# Patient Record
Sex: FEMALE | URBAN - METROPOLITAN AREA
[De-identification: names, ages, dates, MRNs, and addresses within clinical notes are randomized per-mention and may not be internally consistent; named-entity substitution may affect disease eponyms.]

---

## 2021-12-28 ENCOUNTER — APPOINTMENT (RX ONLY)
Dept: URBAN - METROPOLITAN AREA CLINIC 87 | Facility: CLINIC | Age: 31
Setting detail: DERMATOLOGY
End: 2021-12-28

## 2021-12-28 VITALS — HEIGHT: 64 IN | WEIGHT: 155 LBS

## 2021-12-28 DIAGNOSIS — R21 RASH AND OTHER NONSPECIFIC SKIN ERUPTION: ICD-10-CM

## 2021-12-28 DIAGNOSIS — L20.89 OTHER ATOPIC DERMATITIS: ICD-10-CM

## 2021-12-28 DIAGNOSIS — L30.8 OTHER SPECIFIED DERMATITIS: ICD-10-CM

## 2021-12-28 PROBLEM — L08.89 OTHER SPECIFIED LOCAL INFECTIONS OF THE SKIN AND SUBCUTANEOUS TISSUE: Status: ACTIVE | Noted: 2021-12-28

## 2021-12-28 PROBLEM — L20.84 INTRINSIC (ALLERGIC) ECZEMA: Status: ACTIVE | Noted: 2021-12-28

## 2021-12-28 PROCEDURE — ? COUNSELING

## 2021-12-28 PROCEDURE — ? PRESCRIPTION

## 2021-12-28 PROCEDURE — ? TREATMENT REGIMEN

## 2021-12-28 PROCEDURE — ? BIOPSY BY PUNCH METHOD

## 2021-12-28 PROCEDURE — 11104 PUNCH BX SKIN SINGLE LESION: CPT

## 2021-12-28 PROCEDURE — 99203 OFFICE O/P NEW LOW 30 MIN: CPT | Mod: 25

## 2021-12-28 RX ORDER — CLOBETASOL PROPIONATE 0.5 MG/G
OINTMENT TOPICAL
Qty: 60 | Refills: 3 | Status: ERX | COMMUNITY
Start: 2021-12-28

## 2021-12-28 RX ORDER — DESONIDE 0.5 MG/G
CREAM TOPICAL BID
Qty: 60 | Refills: 3 | Status: ERX | COMMUNITY
Start: 2021-12-28

## 2021-12-28 RX ADMIN — CLOBETASOL PROPIONATE: 0.5 OINTMENT TOPICAL at 00:00

## 2021-12-28 RX ADMIN — DESONIDE: 0.5 CREAM TOPICAL at 00:00

## 2021-12-28 ASSESSMENT — LOCATION DETAILED DESCRIPTION DERM
LOCATION DETAILED: RIGHT SUPERIOR POSTERIOR NECK
LOCATION DETAILED: LEFT RING FINGERTIP
LOCATION DETAILED: LEFT SUPERIOR POSTERIOR NECK
LOCATION DETAILED: RIGHT AXILLARY VAULT
LOCATION DETAILED: LEFT AXILLARY VAULT

## 2021-12-28 ASSESSMENT — LOCATION ZONE DERM
LOCATION ZONE: AXILLAE
LOCATION ZONE: FINGER
LOCATION ZONE: NECK

## 2021-12-28 ASSESSMENT — LOCATION SIMPLE DESCRIPTION DERM
LOCATION SIMPLE: LEFT AXILLARY VAULT
LOCATION SIMPLE: POSTERIOR NECK
LOCATION SIMPLE: RIGHT AXILLARY VAULT
LOCATION SIMPLE: LEFT RING FINGER

## 2021-12-28 NOTE — PROCEDURE: BIOPSY BY PUNCH METHOD
Anesthesia Volume In Cc: 0.5
Size Of Lesion In Cm (Optional): 0
Was A Bandage Applied: Yes
Render Post-Care Instructions In Note?: no
Anesthesia Type: 1% lidocaine with epinephrine
Dressing: bandage
Biopsy Type: H and E
Epidermal Sutures: 4-0 Ethilon
Consent: Written consent was obtained and risks were reviewed including but not limited to scarring, infection, bleeding, scabbing, incomplete removal, nerve damage and allergy to anesthesia.
Notification Instructions: Patient will be notified of biopsy results. However, patient instructed to call the office if not contacted within 2 weeks.
Detail Level: Detailed
Wound Care: Petrolatum
Information: Selecting Yes will display possible errors in your note based on the variables you have selected. This validation is only offered as a suggestion for you. PLEASE NOTE THAT THE VALIDATION TEXT WILL BE REMOVED WHEN YOU FINALIZE YOUR NOTE. IF YOU WANT TO FAX A PRELIMINARY NOTE YOU WILL NEED TO TOGGLE THIS TO 'NO' IF YOU DO NOT WANT IT IN YOUR FAXED NOTE.
Punch Size In Mm: 4
Home Suture Removal Text: Patient was provided a home suture removal kit and will remove their sutures at home.  If they have any questions or difficulties they will call the office.
Hemostasis: None
Billing Type: Third-Party Bill
Post-Care Instructions: I reviewed with the patient in detail post-care instructions. Patient is to keep the biopsy site dry overnight, and then apply bacitracin twice daily until healed. Patient may apply hydrogen peroxide soaks to remove any crusting.
Suture Removal: 14 days

## 2022-01-07 ENCOUNTER — APPOINTMENT (RX ONLY)
Dept: URBAN - METROPOLITAN AREA CLINIC 87 | Facility: CLINIC | Age: 32
Setting detail: DERMATOLOGY
End: 2022-01-07

## 2022-01-07 VITALS — WEIGHT: 150 LBS | HEIGHT: 64 IN

## 2022-01-07 DIAGNOSIS — Z48.817 ENCOUNTER FOR SURGICAL AFTERCARE FOLLOWING SURGERY ON THE SKIN AND SUBCUTANEOUS TISSUE: ICD-10-CM

## 2022-01-07 DIAGNOSIS — L40.3 PUSTULOSIS PALMARIS ET PLANTARIS: ICD-10-CM

## 2022-01-07 DIAGNOSIS — L40.8 OTHER PSORIASIS: ICD-10-CM

## 2022-01-07 PROBLEM — L01.09 OTHER IMPETIGO: Status: ACTIVE | Noted: 2022-01-07

## 2022-01-07 PROCEDURE — ? COUNSELING

## 2022-01-07 PROCEDURE — ? SUTURE REMOVAL (GLOBAL PERIOD)

## 2022-01-07 PROCEDURE — ? TREATMENT REGIMEN

## 2022-01-07 PROCEDURE — 99024 POSTOP FOLLOW-UP VISIT: CPT

## 2022-01-07 PROCEDURE — 99214 OFFICE O/P EST MOD 30 MIN: CPT

## 2022-01-07 PROCEDURE — ? POST-OP WOUND CHECK

## 2022-01-07 PROCEDURE — ? PRESCRIPTION

## 2022-01-07 RX ORDER — CLOBETASOL PROPIONATE 0.5 MG/G
OINTMENT TOPICAL
Qty: 60 | Refills: 1 | Status: ERX

## 2022-01-07 RX ORDER — CALCIPOTRIENE 0.05 MG/G
OINTMENT TOPICAL
Qty: 60 | Refills: 2 | Status: ERX | COMMUNITY
Start: 2022-01-07

## 2022-01-07 RX ADMIN — CALCIPOTRIENE: 0.05 OINTMENT TOPICAL at 00:00

## 2022-01-07 ASSESSMENT — LOCATION SIMPLE DESCRIPTION DERM
LOCATION SIMPLE: LEFT HAND
LOCATION SIMPLE: LEFT AXILLARY VAULT
LOCATION SIMPLE: RIGHT AXILLARY VAULT
LOCATION SIMPLE: RIGHT HAND

## 2022-01-07 ASSESSMENT — LOCATION DETAILED DESCRIPTION DERM
LOCATION DETAILED: RIGHT AXILLARY VAULT
LOCATION DETAILED: LEFT THENAR EMINENCE
LOCATION DETAILED: LEFT AXILLARY VAULT
LOCATION DETAILED: RIGHT RADIAL PALM

## 2022-01-07 ASSESSMENT — LOCATION ZONE DERM
LOCATION ZONE: HAND
LOCATION ZONE: AXILLAE

## 2022-01-07 ASSESSMENT — PGA PSORIASIS
PGA PSORIASIS 2020: CLEAR
PGA PSORIASIS 2020: MILD

## 2022-01-07 ASSESSMENT — BSA PSORIASIS: % BODY COVERED IN PSORIASIS: 2

## 2022-01-07 NOTE — PROCEDURE: SUTURE REMOVAL (GLOBAL PERIOD)
Add 81110 Cpt? (Important Note: In 2017 The Use Of 42881 Is Being Tracked By Cms To Determine Future Global Period Reimbursement For Global Periods): yes
Detail Level: Detailed

## 2022-01-07 NOTE — PROCEDURE: POST-OP WOUND CHECK
Add 52114 Cpt? (Important Note: In 2017 The Use Of 63972 Is Being Tracked By Cms To Determine Future Global Period Reimbursement For Global Periods): yes
Wound Evaluated By: Dr. Silver
Detail Level: Zone

## 2022-01-07 NOTE — PROCEDURE: TREATMENT REGIMEN
Action 2: Continue
Continue Regimen: Clobetasol on weekends
Continue Regimen: clobetasol BID PRN (currently clear)
Samples Given: Sorilux (1)
Detail Level: Zone
Initiate Regimen: calcipotriene 0.005 % topical ointment Monday through Friday